# Patient Record
Sex: FEMALE
[De-identification: names, ages, dates, MRNs, and addresses within clinical notes are randomized per-mention and may not be internally consistent; named-entity substitution may affect disease eponyms.]

---

## 2023-03-14 ENCOUNTER — OFFICE VISIT (OUTPATIENT)
Dept: MEDICAL GROUP | Facility: OTHER | Age: 63
End: 2023-03-14
Payer: COMMERCIAL

## 2023-03-14 VITALS
TEMPERATURE: 98.7 F | RESPIRATION RATE: 14 BRPM | WEIGHT: 186 LBS | DIASTOLIC BLOOD PRESSURE: 98 MMHG | HEIGHT: 64 IN | BODY MASS INDEX: 31.76 KG/M2 | SYSTOLIC BLOOD PRESSURE: 150 MMHG

## 2023-03-14 DIAGNOSIS — M25.562 ACUTE PAIN OF LEFT KNEE: ICD-10-CM

## 2023-03-14 PROCEDURE — 99213 OFFICE O/P EST LOW 20 MIN: CPT | Mod: GC | Performed by: FAMILY MEDICINE

## 2023-03-14 NOTE — ASSESSMENT & PLAN NOTE
New problem to provider  Concern for MCL sprain +/- ACL sprain +/- meniscal damage  Starting with x-ray 4V left knee - ordered and pending  Will likely need MRI  Referral to ortho placed  Will discuss x-rays after completed

## 2023-03-14 NOTE — PROGRESS NOTES
" Subjective:   Santa Carpio is a 63 y.o. female here for the evaluation and management of Knee Pain (Lt knee pain )      Problem   Left Knee Pain    Patient is a 63 year-old female presenting with left knee pain.  Patient was walking her dog on 3/13 around 1800 when a large dog ran over to her.  Patient was hit on the outside aspect of her knee, forcing it inwards (dog estimated to be around 115 lbs).  Has since noticed swelling and difficulty bearing weight on the knee.  Her son-in-law is an  and was able to provide her with crutches to get around.  Patient is having some difficulty moving the leg in general, specifically citing hip flexion with leg in full extension.  Has tried Aleve with minimal improvement in symptoms.  Believes the swelling is somewhat increased since the injury occurred.    Has a history of ACL surgery on the right leg.         ROS    No current outpatient medications on file.     No current facility-administered medications for this visit.       Allergies  Patient has no allergy information on record.    No past medical history on file.    Patient Active Problem List    Diagnosis Date Noted    Left knee pain 03/14/2023       Past Surgical History  No past surgical history on file.    Social History     Socioeconomic History    Marital status: Single        Objective:     Vitals:    03/14/23 1437   BP: (!) 150/98   BP Location: Right arm   Patient Position: Sitting   BP Cuff Size: Adult   Resp: 14   Temp: 37.1 °C (98.7 °F)   TempSrc: Oral   Weight: 84.4 kg (186 lb)   Height: 1.626 m (5' 4\")     Body mass index is 31.93 kg/m².     Physical Exam  Gen:  AO, NAD  MSK:    --Right knee:  crepitus present with movement, normal testing, lateral and medial scars present from past ACL open surgery (over 30 years ago)  --Left knee: Effusion and ecchymosis present surrounding the knee.  Decreased ROM w/ pain present; pain w/ resisted knee flexion and extension; no tenderness to " palpation over medial/lateral joint lines or medial/lateral patellar facets, tibial tubercle, patellar tendon; negative anterior drawer, posterior drawer, concern for mild laxity with Lachman's (patient resisting secondary to pain).  Pain and mild laxity w/ valgus stress (patient resisting secondary to pain), no pain/laxity with varus stress.  Pain w/ Vikki's.  Unable to perform Thessaly's.    Assessment and Plan:   Santa Carpio is a 63 y.o. female with a Knee Pain (Lt knee pain )     The following was discussed with the patient today.    Problem List Items Addressed This Visit       Left knee pain     New problem to provider  Concern for MCL sprain +/- ACL +/- meniscal damage  Starting with x-ray 4V left knee - ordered and pending  Will likely need MRI  Referral to ortho placed  Will discuss x-rays after completed         Relevant Orders    DX-KNEE COMPLETE 4+ RIGHT    Referral to Orthopedics       Followup: No follow-ups on file.    Sy Newman M.D.    Discussed with Dr. Andrew MD.    Please note that this dictation was created using voice recognition software. I have made every reasonable attempt to correct obvious errors, but I expect that there are errors of grammar and possibly content that I did not discover before finalizing the note.

## 2023-03-15 ENCOUNTER — HOSPITAL ENCOUNTER (OUTPATIENT)
Dept: RADIOLOGY | Facility: MEDICAL CENTER | Age: 63
End: 2023-03-15
Attending: FAMILY MEDICINE
Payer: COMMERCIAL

## 2023-03-15 DIAGNOSIS — M25.562 ACUTE PAIN OF LEFT KNEE: ICD-10-CM

## 2023-03-15 PROCEDURE — 73564 X-RAY EXAM KNEE 4 OR MORE: CPT | Mod: LT
